# Patient Record
Sex: MALE | Race: WHITE | NOT HISPANIC OR LATINO | Employment: UNEMPLOYED | URBAN - METROPOLITAN AREA
[De-identification: names, ages, dates, MRNs, and addresses within clinical notes are randomized per-mention and may not be internally consistent; named-entity substitution may affect disease eponyms.]

---

## 2024-06-12 ENCOUNTER — HOSPITAL ENCOUNTER (OUTPATIENT)
Dept: GENERAL RADIOLOGY | Facility: HOSPITAL | Age: 54
Discharge: HOME | End: 2024-06-12
Admitting: PHYSICIAN ASSISTANT
Payer: COMMERCIAL

## 2024-06-12 ENCOUNTER — TRANSCRIBE ORDERS (OUTPATIENT)
Dept: GENERAL RADIOLOGY | Facility: HOSPITAL | Age: 54
End: 2024-06-12
Payer: COMMERCIAL

## 2024-06-12 DIAGNOSIS — M54.6 PAIN IN THORACIC SPINE: ICD-10-CM

## 2024-06-12 DIAGNOSIS — M54.6 PAIN IN THORACIC SPINE: Primary | ICD-10-CM

## 2024-06-12 PROCEDURE — 72070 X-RAY EXAM THORAC SPINE 2VWS: CPT

## 2025-07-28 ENCOUNTER — OFFICE VISIT (OUTPATIENT)
Dept: UROLOGY | Facility: CLINIC | Age: 55
End: 2025-07-28
Payer: COMMERCIAL

## 2025-07-28 VITALS — BODY MASS INDEX: 28.06 KG/M2 | HEIGHT: 70 IN | RESPIRATION RATE: 18 BRPM | WEIGHT: 196 LBS

## 2025-07-28 DIAGNOSIS — R31.29 OTHER MICROSCOPIC HEMATURIA: Primary | ICD-10-CM

## 2025-07-28 PROBLEM — M51.369 DEGENERATION OF LUMBAR INTERVERTEBRAL DISC: Status: ACTIVE | Noted: 2023-08-14

## 2025-07-28 PROBLEM — E55.9 VITAMIN D DEFICIENCY: Status: ACTIVE | Noted: 2023-08-30

## 2025-07-28 PROBLEM — R74.8 ELEVATED LIVER ENZYMES: Status: ACTIVE | Noted: 2023-12-26

## 2025-07-28 PROBLEM — I10 PRIMARY HYPERTENSION: Status: ACTIVE | Noted: 2023-08-30

## 2025-07-28 PROBLEM — F17.210 CIGARETTE NICOTINE DEPENDENCE WITHOUT COMPLICATION: Status: ACTIVE | Noted: 2023-08-30

## 2025-07-28 PROBLEM — E78.5 HYPERLIPIDEMIA: Status: ACTIVE | Noted: 2023-08-30

## 2025-07-28 LAB
BACTERIA UR QL AUTO: ABNORMAL /HPF
BILIRUB BLD-MCNC: NEGATIVE MG/DL
CLARITY, POC: CLEAR
COLOR UR: YELLOW
EXPIRATION DATE: ABNORMAL
GLUCOSE UR STRIP-MCNC: NEGATIVE MG/DL
HYALINE CASTS UR QL AUTO: ABNORMAL /LPF
KETONES UR QL: NEGATIVE
LEUKOCYTE EST, POC: NEGATIVE
Lab: ABNORMAL
NITRITE UR-MCNC: NEGATIVE MG/ML
PH UR: 6 [PH] (ref 5–8)
PROT UR STRIP-MCNC: ABNORMAL MG/DL
RBC # UR STRIP: ABNORMAL /HPF
RBC # UR STRIP: ABNORMAL /UL
REF LAB TEST METHOD: ABNORMAL
SP GR UR: 1.03 (ref 1–1.03)
SQUAMOUS #/AREA URNS HPF: ABNORMAL /HPF
URINE VOLUME: 0
UROBILINOGEN UR QL: ABNORMAL
WBC # UR STRIP: ABNORMAL /HPF

## 2025-07-28 PROCEDURE — 51798 US URINE CAPACITY MEASURE: CPT | Performed by: NURSE PRACTITIONER

## 2025-07-28 PROCEDURE — 81015 MICROSCOPIC EXAM OF URINE: CPT | Performed by: NURSE PRACTITIONER

## 2025-07-28 PROCEDURE — 1159F MED LIST DOCD IN RCRD: CPT | Performed by: NURSE PRACTITIONER

## 2025-07-28 PROCEDURE — 1160F RVW MEDS BY RX/DR IN RCRD: CPT | Performed by: NURSE PRACTITIONER

## 2025-07-28 PROCEDURE — 99204 OFFICE O/P NEW MOD 45 MIN: CPT | Performed by: NURSE PRACTITIONER

## 2025-07-28 RX ORDER — ATENOLOL 50 MG/1
50 TABLET ORAL DAILY
COMMUNITY
Start: 2025-03-26

## 2025-07-28 RX ORDER — ATORVASTATIN CALCIUM 20 MG/1
20 TABLET, FILM COATED ORAL DAILY
COMMUNITY
Start: 2025-04-07

## 2025-07-28 RX ORDER — LISINOPRIL 20 MG/1
10 TABLET ORAL DAILY
COMMUNITY
Start: 2025-04-29

## 2025-07-28 RX ORDER — CYCLOBENZAPRINE HCL 10 MG
10 TABLET ORAL 3 TIMES DAILY PRN
COMMUNITY
Start: 2025-03-24

## 2025-07-28 RX ORDER — PRAMOXINE HYDROCHLORIDE HYDROCORTISONE ACETATE 100; 100 MG/10G; MG/10G
AEROSOL, FOAM TOPICAL
COMMUNITY
Start: 2025-07-07

## 2025-07-28 RX ORDER — MONTELUKAST SODIUM 10 MG/1
10 TABLET ORAL NIGHTLY
COMMUNITY

## 2025-07-28 RX ORDER — ALBUTEROL SULFATE 90 UG/1
1 INHALANT RESPIRATORY (INHALATION) EVERY 6 HOURS PRN
COMMUNITY
Start: 2025-07-25

## 2025-07-28 RX ORDER — IBUPROFEN 800 MG/1
800 TABLET, FILM COATED ORAL EVERY 6 HOURS PRN
COMMUNITY
Start: 2025-03-17

## 2025-07-28 RX ORDER — BACLOFEN 10 MG/1
TABLET ORAL
COMMUNITY

## 2025-07-28 RX ORDER — LEVOCETIRIZINE DIHYDROCHLORIDE 5 MG/1
5 TABLET, FILM COATED ORAL EVERY EVENING
COMMUNITY

## 2025-07-28 NOTE — PROGRESS NOTES
Chief Complaint: Blood in Urine    Subjective         History of Present Illness  Yash Juarez is a 55 y.o. male presents to [facility] to be seen for microhematuria.    Patient was seen by primary care provider on 4/29/2025 for a follow-up appointment.    He had a referral placed here for hematuria as that had been noticed on previous labs.  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    He states that has had a cystoscopy before as a child and has been worked up several times for hematuria.     He has never seen blood in the urine.     9/2024 he had a ua micro with 3-10 rbc/ hpf.     Ua from 4/20/2025 showed 6-10 rbc/ hpf.     He is a cigarette smoker. He smokes 1 ppd x 30 years.     No family hx of  malignancies.     He has a hx of renal stones.         Objective     Past Medical History:   Diagnosis Date    Hematuria     Hyperlipidemia     Hypertension        Past Surgical History:   Procedure Laterality Date    CYSTOSCOPY      during childhood    KNEE ARTHROSCOPY Right     SPINAL FUSION           Current Outpatient Medications:     albuterol sulfate  (90 Base) MCG/ACT inhaler, Inhale 1 puff Every 6 (Six) Hours As Needed., Disp: , Rfl:     atenolol (TENORMIN) 50 MG tablet, Take 1 tablet by mouth Daily., Disp: , Rfl:     atorvastatin (LIPITOR) 20 MG tablet, Take 1 tablet by mouth Daily., Disp: , Rfl:     Cholecalciferol 50 MCG (2000 UT) capsule, Take 1 capsule by mouth Daily., Disp: , Rfl:     cyclobenzaprine (FLEXERIL) 10 MG tablet, Take 1 tablet by mouth 3 (Three) Times a Day As Needed. for muscle spams, Disp: , Rfl:     ibuprofen (ADVIL,MOTRIN) 800 MG tablet, Take 1 tablet by mouth Every 6 (Six) Hours As Needed. for pain, Disp: , Rfl:     lisinopril (PRINIVIL,ZESTRIL) 20 MG tablet, Take 0.5 tablets by mouth Daily., Disp: , Rfl:     Proctofoam HC  "1-1 % rectal foam, insert 1 applicator into THE rectum TWICE DAILY FOR 20 doses, Disp: , Rfl:     baclofen (LIORESAL) 10 MG tablet, TAKE 1 TABLET BY MOUTH THREE TIMES DAILY AS NEEDED FOR muscle SPASMS - must last 30 DAYS, Disp: , Rfl:     levocetirizine (XYZAL) 5 MG tablet, Take 1 tablet by mouth Every Evening., Disp: , Rfl:     montelukast (SINGULAIR) 10 MG tablet, Take 1 tablet by mouth Every Night., Disp: , Rfl:     No Known Allergies     History reviewed. No pertinent family history.    Social History     Socioeconomic History    Marital status: Single   Tobacco Use    Smoking status: Every Day     Current packs/day: 1.00     Average packs/day: 1 pack/day for 35.6 years (35.6 ttl pk-yrs)     Types: Cigarettes     Start date: 1990    Smokeless tobacco: Never   Vaping Use    Vaping status: Never Used   Substance and Sexual Activity    Alcohol use: Yes     Comment: occasionally    Drug use: Never       Vital Signs:   Resp 18   Ht 177.8 cm (70\")   Wt 88.9 kg (196 lb)   BMI 28.12 kg/m²      Physical Exam     Result Review :   The following data was reviewed by: GILBERT Hanson on 07/28/2025:  Results for orders placed or performed in visit on 07/28/25   Bladder Scan    Collection Time: 07/28/25  9:57 AM   Result Value Ref Range    Urine Volume 0    POC Urinalysis Dipstick, Automated    Collection Time: 07/28/25 10:08 AM    Specimen: Urine   Result Value Ref Range    Color Yellow Yellow, Straw, Dark Yellow, Justina    Clarity, UA Clear Clear    Specific Gravity  1.030 1.005 - 1.030    pH, Urine 6.0 5.0 - 8.0    Leukocytes Negative Negative    Nitrite, UA Negative Negative    Protein, POC 30 mg/dL (A) Negative mg/dL    Glucose, UA Negative Negative mg/dL    Ketones, UA Negative Negative    Urobilinogen, UA 0.2 E.U./dL Normal, 0.2 E.U./dL    Bilirubin Negative Negative    Blood, UA Moderate (A) Negative    Lot Number [id number]     Expiration Date 62,026         Bladder Scan interpretation 07/28/2025    Estimation " of residual urine via Q-BotI 3000 Verathon Bladder Scan  MA/nurse performing: thomas gregory Rn   Residual Urine: 0 ml  Indication: Other microscopic hematuria   Position: Supine  Examination: Incremental scanning of the suprapubic area using 2.0 MHz transducer using copious amounts of acoustic gel.   Findings: An anechoic area was demonstrated which represented the bladder, with measurement of residual urine as noted. I inspected this myself. In that the residual urine was stable or insignificant, refer to plan for treatment and plan necessary at this time.          Procedures        Assessment and Plan    Diagnoses and all orders for this visit:    1. Other microscopic hematuria (Primary)  -     POC Urinalysis Dipstick, Automated  -     Cystoscopy; Future  -     Urinalysis, Microscopic Only - Urine, Clean Catch; Future  -     CT Abdomen Pelvis With & Without Contrast; Future  -     Urinalysis, Microscopic Only - Urine, Clean Catch    Other orders  -     Bladder Scan          Microhematuria- no prior workup,  documented RBCs on HPF as early as 2024; warrants further hematuria workup. This was discussed with patient at length.  Will proceed with asymptomatic microhematuria workup per AUA guidelines including upper and lower urinary tract evaluations.  Obtain CT scan with/without/and delayed imaging for upper tract evaluation; patient denies contrast allergy  Patient to schedule cystoscopy for lower urinary tract evaluation after CT scan     All questions addressed      I spent 15  minutes caring for Yash on this date of service. This time includes time spent by me in the following activities:reviewing tests, obtaining and/or reviewing a separately obtained history, performing a medically appropriate examination and/or evaluation , counseling and educating the patient/family/caregiver, ordering medications, tests, or procedures, and documenting information in the medical record  Follow Up   Return in about 3 months (around  10/28/2025) for f/u microhematuria .  Patient was given instructions and counseling regarding his condition or for health maintenance advice. Please see specific information pulled into the AVS if appropriate.         This document has been electronically signed by GILBERT Hanson  July 28, 2025 10:23 EDT

## 2025-07-29 ENCOUNTER — RESULTS FOLLOW-UP (OUTPATIENT)
Dept: UROLOGY | Facility: CLINIC | Age: 55
End: 2025-07-29
Payer: COMMERCIAL

## 2025-07-29 NOTE — TELEPHONE ENCOUNTER
Called Patient and relayed lab results. Patient ok with going ahead with CT Scan. Scheduling number given to patient and instructions on how to schedule

## 2025-07-29 NOTE — PROGRESS NOTES
Please let pt know he has continued to have blood in his urine.  Would recommend CT scan and we can discuss the cystoscopy at his next

## 2025-08-13 ENCOUNTER — TELEPHONE (OUTPATIENT)
Dept: UROLOGY | Age: 55
End: 2025-08-13
Payer: COMMERCIAL